# Patient Record
Sex: MALE | Race: WHITE | NOT HISPANIC OR LATINO | Employment: OTHER | URBAN - METROPOLITAN AREA
[De-identification: names, ages, dates, MRNs, and addresses within clinical notes are randomized per-mention and may not be internally consistent; named-entity substitution may affect disease eponyms.]

---

## 2023-08-03 LAB
ALBUMIN (G/DL) IN SER/PLAS: 4.6 G/DL (ref 3.4–5)
ANION GAP IN SER/PLAS: 13 MMOL/L (ref 10–20)
APPEARANCE, URINE: CLEAR
BILIRUBIN, URINE: NEGATIVE
BLOOD, URINE: ABNORMAL
CALCIUM (MG/DL) IN SER/PLAS: 10.1 MG/DL (ref 8.6–10.6)
CARBON DIOXIDE, TOTAL (MMOL/L) IN SER/PLAS: 27 MMOL/L (ref 21–32)
CHLORIDE (MMOL/L) IN SER/PLAS: 104 MMOL/L (ref 98–107)
CHOLESTEROL (MG/DL) IN SER/PLAS: 158 MG/DL (ref 0–199)
CHOLESTEROL IN HDL (MG/DL) IN SER/PLAS: 34.9 MG/DL
CHOLESTEROL/HDL RATIO: 4.5
COLOR, URINE: ABNORMAL
CREATININE (MG/DL) IN SER/PLAS: 1.14 MG/DL (ref 0.5–1.3)
GFR MALE: 73 ML/MIN/1.73M2
GLUCOSE (MG/DL) IN SER/PLAS: 93 MG/DL (ref 74–99)
GLUCOSE, URINE: NEGATIVE MG/DL
KETONES, URINE: NEGATIVE MG/DL
LDL: 92 MG/DL (ref 0–99)
LEUKOCYTE ESTERASE, URINE: NEGATIVE
MUCUS, URINE: NORMAL /LPF
NITRITE, URINE: NEGATIVE
PH, URINE: 5 (ref 5–8)
PHOSPHATE (MG/DL) IN SER/PLAS: 3.2 MG/DL (ref 2.5–4.9)
POTASSIUM (MMOL/L) IN SER/PLAS: 4.6 MMOL/L (ref 3.5–5.3)
PROSTATE SPECIFIC AG (NG/ML) IN SER/PLAS: 0.58 NG/ML (ref 0–4)
PROTEIN, URINE: NEGATIVE MG/DL
RBC, URINE: 1 /HPF (ref 0–5)
SODIUM (MMOL/L) IN SER/PLAS: 139 MMOL/L (ref 136–145)
SPECIFIC GRAVITY, URINE: 1.01 (ref 1–1.03)
TRIGLYCERIDE (MG/DL) IN SER/PLAS: 157 MG/DL (ref 0–149)
UREA NITROGEN (MG/DL) IN SER/PLAS: 22 MG/DL (ref 6–23)
UROBILINOGEN, URINE: <2 MG/DL (ref 0–1.9)
VLDL: 31 MG/DL (ref 0–40)
WBC, URINE: NORMAL /HPF (ref 0–5)

## 2023-08-04 LAB — URINE CULTURE: NO GROWTH

## 2024-01-22 ENCOUNTER — TELEMEDICINE (OUTPATIENT)
Dept: PRIMARY CARE | Facility: CLINIC | Age: 64
End: 2024-01-22
Payer: COMMERCIAL

## 2024-01-22 DIAGNOSIS — U07.1 COVID-19: Primary | ICD-10-CM

## 2024-01-22 PROBLEM — N28.1 RENAL CYSTS, ACQUIRED, BILATERAL: Status: RESOLVED | Noted: 2024-01-22 | Resolved: 2024-01-22

## 2024-01-22 PROBLEM — K57.30 DIVERTICULOSIS OF SIGMOID COLON: Status: RESOLVED | Noted: 2024-01-22 | Resolved: 2024-01-22

## 2024-01-22 PROBLEM — M25.661 DECREASED RANGE OF MOTION OF RIGHT KNEE: Status: RESOLVED | Noted: 2024-01-22 | Resolved: 2024-01-22

## 2024-01-22 PROBLEM — I49.9 ARRHYTHMIA: Status: RESOLVED | Noted: 2024-01-22 | Resolved: 2024-01-22

## 2024-01-22 PROBLEM — M54.50 LOW BACK PAIN: Status: RESOLVED | Noted: 2024-01-22 | Resolved: 2024-01-22

## 2024-01-22 PROCEDURE — 99214 OFFICE O/P EST MOD 30 MIN: CPT | Performed by: STUDENT IN AN ORGANIZED HEALTH CARE EDUCATION/TRAINING PROGRAM

## 2024-01-22 NOTE — PROGRESS NOTES
Subjective   Patient ID: Fabrizio Copeland is a 63 y.o. male who presents for No chief complaint on file..    HPI     Patient presenting today and is COVID positive with symptoms that started less than 5 days ago; this patient is experiencing with mild-mod symptoms, at home, not on oxygen. Given this patient's comorbidities and age this patient meets criteria for Paxlovid. This is dosed based on renal function.    I have e-scripted this in to the patient's preferred pharmacy. Patient is to isolate. All questions and concerns were addressed.     Review of Systems    Objective   There were no vitals taken for this visit.    Physical Exam  Gen: Well appearing, AAO x 3.   HEENT: NC/AT. Symmetric pupils on webcam.   Pulm: no evidence of increased work of breathing on webcam  Skin: no blemishes or rashes on webcam    Lab Results   Component Value Date    WBC 6.6 08/13/2022    HGB 15.7 08/13/2022    HCT 48.4 08/13/2022     08/13/2022    CHOL 158 08/03/2023    TRIG 157 (H) 08/03/2023    HDL 34.9 (A) 08/03/2023    ALT 25 08/13/2022    AST 19 08/13/2022     08/03/2023    K 4.6 08/03/2023     08/03/2023    CREATININE 1.14 08/03/2023    BUN 22 08/03/2023    CO2 27 08/03/2023    PSA 0.58 08/03/2023    HGBA1C 5.0 05/12/2020     par   Assessment/Plan     Patient presenting today and is COVID positive with symptoms that started less than 5 days ago; this patient is experiencing with mild-mod symptoms, at home, not on oxygen. Given this patient's comorbidities and age this patient meets criteria for Paxlovid. This is dosed based on renal function.    I have e-scripted this in to the patient's preferred pharmacy. Patient is to isolate. All questions and concerns were addressed.

## 2024-05-20 ENCOUNTER — PATIENT MESSAGE (OUTPATIENT)
Dept: PRIMARY CARE | Facility: CLINIC | Age: 64
End: 2024-05-20
Payer: COMMERCIAL

## 2024-05-20 DIAGNOSIS — Z00.00 HEALTHCARE MAINTENANCE: Primary | ICD-10-CM

## 2024-06-07 ENCOUNTER — LAB (OUTPATIENT)
Dept: LAB | Facility: LAB | Age: 64
End: 2024-06-07
Payer: COMMERCIAL

## 2024-06-07 DIAGNOSIS — Z00.00 HEALTHCARE MAINTENANCE: ICD-10-CM

## 2024-06-07 DIAGNOSIS — C64.9 MALIGNANT NEOPLASM OF UNSPECIFIED KIDNEY, EXCEPT RENAL PELVIS (MULTI): Primary | ICD-10-CM

## 2024-06-07 LAB
ALBUMIN SERPL BCP-MCNC: 4.6 G/DL (ref 3.4–5)
ALP SERPL-CCNC: 62 U/L (ref 33–136)
ALT SERPL W P-5'-P-CCNC: 21 U/L (ref 10–52)
ANION GAP SERPL CALC-SCNC: 13 MMOL/L (ref 10–20)
APPEARANCE UR: CLEAR
AST SERPL W P-5'-P-CCNC: 18 U/L (ref 9–39)
BASOPHILS # BLD AUTO: 0.05 X10*3/UL (ref 0–0.1)
BASOPHILS NFR BLD AUTO: 0.8 %
BILIRUB SERPL-MCNC: 0.7 MG/DL (ref 0–1.2)
BILIRUB UR STRIP.AUTO-MCNC: NEGATIVE MG/DL
BUN SERPL-MCNC: 20 MG/DL (ref 6–23)
CALCIUM SERPL-MCNC: 9.9 MG/DL (ref 8.6–10.6)
CHLORIDE SERPL-SCNC: 102 MMOL/L (ref 98–107)
CHOLEST SERPL-MCNC: 158 MG/DL (ref 0–199)
CHOLESTEROL/HDL RATIO: 4
CO2 SERPL-SCNC: 28 MMOL/L (ref 21–32)
COLOR UR: NORMAL
CREAT SERPL-MCNC: 1.1 MG/DL (ref 0.5–1.3)
EGFRCR SERPLBLD CKD-EPI 2021: 76 ML/MIN/1.73M*2
EOSINOPHIL # BLD AUTO: 0.11 X10*3/UL (ref 0–0.7)
EOSINOPHIL NFR BLD AUTO: 1.7 %
ERYTHROCYTE [DISTWIDTH] IN BLOOD BY AUTOMATED COUNT: 12.4 % (ref 11.5–14.5)
GLUCOSE SERPL-MCNC: 93 MG/DL (ref 74–99)
GLUCOSE UR STRIP.AUTO-MCNC: NORMAL MG/DL
HCT VFR BLD AUTO: 48.2 % (ref 41–52)
HDLC SERPL-MCNC: 40 MG/DL
HGB BLD-MCNC: 15.8 G/DL (ref 13.5–17.5)
IMM GRANULOCYTES # BLD AUTO: 0.02 X10*3/UL (ref 0–0.7)
IMM GRANULOCYTES NFR BLD AUTO: 0.3 % (ref 0–0.9)
KETONES UR STRIP.AUTO-MCNC: NEGATIVE MG/DL
LDLC SERPL CALC-MCNC: 99 MG/DL
LEUKOCYTE ESTERASE UR QL STRIP.AUTO: NEGATIVE
LYMPHOCYTES # BLD AUTO: 1.44 X10*3/UL (ref 1.2–4.8)
LYMPHOCYTES NFR BLD AUTO: 22 %
MCH RBC QN AUTO: 30.6 PG (ref 26–34)
MCHC RBC AUTO-ENTMCNC: 32.8 G/DL (ref 32–36)
MCV RBC AUTO: 93 FL (ref 80–100)
MONOCYTES # BLD AUTO: 0.67 X10*3/UL (ref 0.1–1)
MONOCYTES NFR BLD AUTO: 10.2 %
NEUTROPHILS # BLD AUTO: 4.26 X10*3/UL (ref 1.2–7.7)
NEUTROPHILS NFR BLD AUTO: 65 %
NITRITE UR QL STRIP.AUTO: NEGATIVE
NON HDL CHOLESTEROL: 118 MG/DL (ref 0–149)
NRBC BLD-RTO: 0 /100 WBCS (ref 0–0)
PH UR STRIP.AUTO: 5.5 [PH]
PHOSPHATE SERPL-MCNC: 2.8 MG/DL (ref 2.5–4.9)
PLATELET # BLD AUTO: 201 X10*3/UL (ref 150–450)
POTASSIUM SERPL-SCNC: 4.6 MMOL/L (ref 3.5–5.3)
PROT SERPL-MCNC: 7.2 G/DL (ref 6.4–8.2)
PROT UR STRIP.AUTO-MCNC: NEGATIVE MG/DL
PSA SERPL-MCNC: 0.61 NG/ML
RBC # BLD AUTO: 5.17 X10*6/UL (ref 4.5–5.9)
RBC # UR STRIP.AUTO: NEGATIVE /UL
SODIUM SERPL-SCNC: 138 MMOL/L (ref 136–145)
SP GR UR STRIP.AUTO: 1.01
TRIGL SERPL-MCNC: 94 MG/DL (ref 0–149)
UROBILINOGEN UR STRIP.AUTO-MCNC: NORMAL MG/DL
VLDL: 19 MG/DL (ref 0–40)
WBC # BLD AUTO: 6.6 X10*3/UL (ref 4.4–11.3)

## 2024-06-07 PROCEDURE — 36415 COLL VENOUS BLD VENIPUNCTURE: CPT

## 2024-06-07 PROCEDURE — 85025 COMPLETE CBC W/AUTO DIFF WBC: CPT

## 2024-06-07 PROCEDURE — 84153 ASSAY OF PSA TOTAL: CPT

## 2024-06-07 PROCEDURE — 80061 LIPID PANEL: CPT

## 2024-06-07 PROCEDURE — 81003 URINALYSIS AUTO W/O SCOPE: CPT

## 2024-06-07 PROCEDURE — 80053 COMPREHEN METABOLIC PANEL: CPT

## 2024-06-07 PROCEDURE — 84100 ASSAY OF PHOSPHORUS: CPT

## 2024-06-12 ENCOUNTER — HOSPITAL ENCOUNTER (OUTPATIENT)
Dept: RADIOLOGY | Facility: CLINIC | Age: 64
Discharge: HOME | End: 2024-06-12
Payer: COMMERCIAL

## 2024-06-12 DIAGNOSIS — C64.9 MALIGNANT NEOPLASM OF UNSPECIFIED KIDNEY, EXCEPT RENAL PELVIS (MULTI): ICD-10-CM

## 2024-06-12 PROCEDURE — 76770 US EXAM ABDO BACK WALL COMP: CPT

## 2024-06-12 PROCEDURE — 76770 US EXAM ABDO BACK WALL COMP: CPT | Performed by: RADIOLOGY

## 2024-07-01 ENCOUNTER — APPOINTMENT (OUTPATIENT)
Dept: PRIMARY CARE | Facility: CLINIC | Age: 64
End: 2024-07-01
Payer: COMMERCIAL

## 2024-07-11 ENCOUNTER — APPOINTMENT (OUTPATIENT)
Dept: UROLOGY | Facility: CLINIC | Age: 64
End: 2024-07-11
Payer: COMMERCIAL

## 2024-07-25 ENCOUNTER — APPOINTMENT (OUTPATIENT)
Dept: UROLOGY | Facility: CLINIC | Age: 64
End: 2024-07-25
Payer: COMMERCIAL

## 2024-07-25 VITALS
HEIGHT: 68 IN | BODY MASS INDEX: 33.8 KG/M2 | HEART RATE: 86 BPM | TEMPERATURE: 96.4 F | SYSTOLIC BLOOD PRESSURE: 125 MMHG | WEIGHT: 223 LBS | DIASTOLIC BLOOD PRESSURE: 74 MMHG

## 2024-07-25 DIAGNOSIS — R82.90 URINE ABNORMALITY: ICD-10-CM

## 2024-07-25 DIAGNOSIS — R31.0 GROSS HEMATURIA: Primary | ICD-10-CM

## 2024-07-25 LAB
POC APPEARANCE, URINE: CLEAR
POC BILIRUBIN, URINE: NEGATIVE
POC BLOOD, URINE: NEGATIVE
POC COLOR, URINE: YELLOW
POC GLUCOSE, URINE: NEGATIVE MG/DL
POC KETONES, URINE: NEGATIVE MG/DL
POC LEUKOCYTES, URINE: NEGATIVE
POC NITRITE,URINE: NEGATIVE
POC PH, URINE: 6 PH
POC PROTEIN, URINE: NEGATIVE MG/DL
POC SPECIFIC GRAVITY, URINE: 1.01
POC UROBILINOGEN, URINE: 0.2 EU/DL

## 2024-07-25 PROCEDURE — 3008F BODY MASS INDEX DOCD: CPT | Performed by: NURSE PRACTITIONER

## 2024-07-25 PROCEDURE — 99214 OFFICE O/P EST MOD 30 MIN: CPT | Performed by: NURSE PRACTITIONER

## 2024-07-25 PROCEDURE — 81003 URINALYSIS AUTO W/O SCOPE: CPT | Performed by: NURSE PRACTITIONER

## 2024-07-25 NOTE — PROGRESS NOTES
UROLOGIC FOLLOW-UP VISIT     PROBLEM LIST:  1. Gross hematuria  CT urography w 3D volume rendered imaging    Creatinine, Serum      2. Urine abnormality  POCT UA Automated manually resulted           HISTORY OF PRESENT ILLNESS:   Fabrizio Copeland is a 64 y.o. with hx of RCC s/p partial nephrectomy 11/28/12  BPH not on medication    INTERVAL HISTORY:  Returns today for FUV  Seen accompanied by spouse  Notes hematuria and small clot after taking Mobic  No bleeding with ibuprofen, even when taking around the clock  Bleeding also occurs with dehydration, resolves with hydration  Needs an MRI, asking about ?clips from nephrectomy    PAST MEDICAL HISTORY:  Past Medical History:   Diagnosis Date    Arrhythmia 01/22/2024    Decreased range of motion of right knee 01/22/2024    Diverticulosis of sigmoid colon 01/22/2024    Low back pain 01/22/2024    Other conditions influencing health status 04/18/2014    No significant past medical history    Personal history of other diseases of urinary system     History of kidney disease    Renal cysts, acquired, bilateral 01/22/2024       PAST SURGICAL HISTORY:  Past Surgical History:   Procedure Laterality Date    HERNIA REPAIR  01/21/2014    Hernia Repair    OTHER SURGICAL HISTORY  01/21/2014    Jaw Fracture Treatment Compound    OTHER SURGICAL HISTORY  01/21/2014    Partial Nephrectomy        ALLERGIES:   No Known Allergies     MEDICATIONS:   No current outpatient medications on file prior to visit.     No current facility-administered medications on file prior to visit.        SOCIAL HISTORY:  Patient  reports that he has never smoked. He has never used smokeless tobacco. He reports that he does not drink alcohol and does not use drugs.   Social History     Socioeconomic History    Marital status:      Spouse name: Not on file    Number of children: Not on file    Years of education: Not on file    Highest education level: Not on file   Occupational History    Not on file    Tobacco Use    Smoking status: Never    Smokeless tobacco: Never   Substance and Sexual Activity    Alcohol use: Never    Drug use: Never    Sexual activity: Not on file   Other Topics Concern    Not on file   Social History Narrative    Not on file     Social Determinants of Health     Financial Resource Strain: Not on file   Food Insecurity: Not on file   Transportation Needs: Not on file   Physical Activity: Not on file   Stress: Not on file   Social Connections: Not on file   Intimate Partner Violence: Not on file   Housing Stability: Not on file       FAMILY HISTORY:  No family history on file.    REVIEW OF SYSTEMS:  All systems reviewed, pertinent negatives as noted in HPI.     PHYSICAL EXAM:  Visit Vitals  /74   Pulse 86   Temp 35.8 °C (96.4 °F) (Temporal)     Constitutional: Well-developed and well-nourished. No distress.    Head: Normocephalic and atraumatic.    Neck: Normal range of motion.     Pulmonary/Chest: Effort normal. No respiratory distress.   Abdominal: Non-distended.  : See below.  Integumentary: No rash or lesions visualized.  Musculoskeletal: Normal range of motion.    Neurological: Alert and oriented.  Psychiatric: Normal mood and affect. Thought content normal.      LABORATORY REVIEW:   Lab Results   Component Value Date    BUN 20 06/07/2024    CREATININE 1.10 06/07/2024    EGFR 76 06/07/2024     06/07/2024    K 4.6 06/07/2024     06/07/2024    CO2 28 06/07/2024    CALCIUM 9.9 06/07/2024      Lab Results   Component Value Date    WBC 6.6 06/07/2024    RBC 5.17 06/07/2024    HGB 15.8 06/07/2024    HCT 48.2 06/07/2024    MCV 93 06/07/2024    MCH 30.6 06/07/2024    MCHC 32.8 06/07/2024    RDW 12.4 06/07/2024     06/07/2024        Lab Results   Component Value Date    PSA 0.61 06/07/2024    PSA 0.58 08/03/2023    PSA 0.72 08/13/2022    PSA 0.54 05/12/2020    PSA 0.65 08/17/2019    PSA 0.68 01/21/2019    PSA 0.63 02/05/2018        Urine dipstick shows negative for all  components.        Assessment:      1. Gross hematuria  CT urography w 3D volume rendered imaging    Creatinine, Serum      2. Urine abnormality  POCT UA Automated manually resulted          Fabrizio Copeland is a 64 y.o. with hx of RCC s/p partial nephrectomy 11/28/12; op note not available in Epic  Surgical clips noted most recently on CT A/P 7/17/18  Needs MRI of knee, looking for details about clips that were used  Recent gross hematuria associated with Mobic   Renal US 6/12/24 with small B cysts  UA today negative     Plan:   CT urogram  Will reach out to Dr. Campos's team for details about surgical clips  RTC with Dr. Campos for cystoscopy  Encouraged to contact us in the interim with any questions, concerns

## 2024-08-05 ENCOUNTER — APPOINTMENT (OUTPATIENT)
Dept: PRIMARY CARE | Facility: CLINIC | Age: 64
End: 2024-08-05
Payer: COMMERCIAL

## 2024-08-05 VITALS
HEART RATE: 71 BPM | HEIGHT: 68 IN | SYSTOLIC BLOOD PRESSURE: 120 MMHG | DIASTOLIC BLOOD PRESSURE: 70 MMHG | TEMPERATURE: 97.8 F | WEIGHT: 210.2 LBS | BODY MASS INDEX: 31.86 KG/M2

## 2024-08-05 DIAGNOSIS — Z00.00 HEALTHCARE MAINTENANCE: Primary | ICD-10-CM

## 2024-08-05 DIAGNOSIS — G89.29 CHRONIC PAIN OF RIGHT KNEE: ICD-10-CM

## 2024-08-05 DIAGNOSIS — E66.09 CLASS 1 OBESITY DUE TO EXCESS CALORIES WITHOUT SERIOUS COMORBIDITY WITH BODY MASS INDEX (BMI) OF 31.0 TO 31.9 IN ADULT: ICD-10-CM

## 2024-08-05 DIAGNOSIS — M25.561 CHRONIC PAIN OF RIGHT KNEE: ICD-10-CM

## 2024-08-05 DIAGNOSIS — N02.9 IDIOPATHIC HEMATURIA, UNSPECIFIED WHETHER GLOMERULAR MORPHOLOGIC CHANGES PRESENT: ICD-10-CM

## 2024-08-05 PROCEDURE — 90750 HZV VACC RECOMBINANT IM: CPT | Performed by: STUDENT IN AN ORGANIZED HEALTH CARE EDUCATION/TRAINING PROGRAM

## 2024-08-05 PROCEDURE — 99396 PREV VISIT EST AGE 40-64: CPT | Performed by: STUDENT IN AN ORGANIZED HEALTH CARE EDUCATION/TRAINING PROGRAM

## 2024-08-05 PROCEDURE — 1036F TOBACCO NON-USER: CPT | Performed by: STUDENT IN AN ORGANIZED HEALTH CARE EDUCATION/TRAINING PROGRAM

## 2024-08-05 PROCEDURE — 90471 IMMUNIZATION ADMIN: CPT | Performed by: STUDENT IN AN ORGANIZED HEALTH CARE EDUCATION/TRAINING PROGRAM

## 2024-08-05 PROCEDURE — 3008F BODY MASS INDEX DOCD: CPT | Performed by: STUDENT IN AN ORGANIZED HEALTH CARE EDUCATION/TRAINING PROGRAM

## 2024-08-05 PROCEDURE — 90472 IMMUNIZATION ADMIN EACH ADD: CPT | Performed by: STUDENT IN AN ORGANIZED HEALTH CARE EDUCATION/TRAINING PROGRAM

## 2024-08-05 PROCEDURE — 90679 RSV VACC PREF RECOMB ADJT IM: CPT | Performed by: STUDENT IN AN ORGANIZED HEALTH CARE EDUCATION/TRAINING PROGRAM

## 2024-08-05 ASSESSMENT — ENCOUNTER SYMPTOMS
LOSS OF SENSATION IN FEET: 0
OCCASIONAL FEELINGS OF UNSTEADINESS: 0
DEPRESSION: 0

## 2024-08-05 ASSESSMENT — PAIN SCALES - GENERAL: PAINLEVEL: 10-WORST PAIN EVER

## 2024-08-05 NOTE — PATIENT INSTRUCTIONS
Your blood work is up to date; no need for additional draw at this appointment    Follow up with your specialists as previously scheduled.     Congrats on the weight loss! Keep at it with diet and exercise.     You received your first shingrix shot today! Follow up in 2-6 months to complete this two-step series.      You got your RSV shot today!     See me in 3 months.

## 2024-08-05 NOTE — PROGRESS NOTES
"Subjective   Patient ID: Devaughn Copeland is a 64 y.o. male who presents for Annual Exam (cpe).    HPI   Has been ~2.5 years or so since last in. Became a grandfather since last in! Work and family are going well.    Re: obesity - Down ~20lb over the last 3 months with diet and exercise.     Re: RCC - see urology notes. Hx remote surgery (2012) for RCC. Has CT and cystoscopy scheduled in the coming weeks for occasional hematuria with clots.    Re: knee- R knee pain, subacute on chronic. In with ortho. Had injection into the knee and also improved greatly with PT. Has upcoming MRI soon once he gets clearance (?sutures/clips from RCC?). Pain is manageable however.    Re: HM - CRS current (2020), repeat 6/2025. PSA UTD. Due for RSV and VZV, amenable to both.    PMHx, FHx, Social Hx, Surg Hx personally reviewed at this appointment. No pertinent findings and/or changes from prior (if applicable).    ROS: Denies wt gain/loss f/c HA LoC CP SOB NVDC. See HPI above, and scanned sheet (if applicable). All other systems are reviewed and are without complaint.       Review of Systems    Objective   /70 (BP Location: Left arm, Patient Position: Sitting, BP Cuff Size: Large adult)   Pulse 71   Temp 36.6 °C (97.8 °F) (Temporal)   Ht 1.727 m (5' 8\")   Wt 95.3 kg (210 lb 3.2 oz)   BMI 31.96 kg/m²     Physical Exam  Gen: well developed in NAD. AAO x3.  HEENT: NC/AT. Anicteric sclera, symmetric pupils. MMM no thrush.  Neck: Soft, supple. No LAD. No goiter.   CV: RRR nl s1s2 no m/r/g  Pulm: CTAB no w/r/r, good air exchange  GI: soft NTND BS+ no hsm  Ext: WWP no edema  Neuro: II-XII grossly intact, nonfocal systemic findings  MSK: 5/5 strength b/l UE and LE  Gait: unremarkable     Lab Results   Component Value Date    WBC 6.6 06/07/2024    HGB 15.8 06/07/2024    HCT 48.2 06/07/2024     06/07/2024    CHOL 158 06/07/2024    TRIG 94 06/07/2024    HDL 40.0 06/07/2024    ALT 21 06/07/2024    AST 18 06/07/2024     " 06/07/2024    K 4.6 06/07/2024     06/07/2024    CREATININE 1.10 06/07/2024    BUN 20 06/07/2024    CO2 28 06/07/2024    PSA 0.61 06/07/2024    HGBA1C 5.0 05/12/2020     par    US renal complete  Narrative: Interpreted By:  Juan Carlos Guerra,   STUDY:  US RENAL COMPLETE;  6/12/2024 8:31 am      INDICATION:  Signs/Symptoms: hx RCC, partial nephrectomy  C64.9: Malignant  neoplasm of kidney.      COMPARISON:  None.      ACCESSION NUMBER(S):  [id number]      ORDERING CLINICIAN:  HANSEL BLANCHARD      TECHNIQUE:  Multiple images of the kidneys were obtained  .      FINDINGS:  RIGHT KIDNEY:  The right kidney measures 10 in length. The renal cortical  echogenicity and thickness are within normal limits. No  hydronephrosis is present; no evidence of nephrolithiasis. Simple  small right renal cyst measuring 13 x 9 x 11 mm.      LEFT KIDNEY:  The left kidney measures 12.6 in length. The renal cortical  echogenicity and thickness are within normal limits. No  hydronephrosis is present; no evidence of nephrolithiasis. Small left  renal cyst measuring approximately 10 mm in average diameter.      BLADDER:  Grossly unremarkable.      Prostate is 35 x 38 x 41 mm.      Impression: Tiny renal cysts. No hydronephrosis.      MACRO:  None      Signed by: Juan Carlos Guerra 6/13/2024 11:47 AM  Dictation workstation:   [id number]        Assessment/Plan   Doing great.     # Knee  - follow up ortho    # hx RCC, occasional clots in urine  - follow up urology; cystoscopy soon  - PSA UTD    # Obesity: BMI > 30  - counselled on wt loss via diet, exercise, and other lifestyle modifications     # Health Maintenance  - routine blood work  - Colon Cancer Screening: UTD, repeat 6/2025  - PSA: UTD  - Immunizations:  RSV and VZV 1 of 2  - AAA screening:  not indicated     Problem List Items Addressed This Visit             ICD-10-CM    Idiopathic hematuria N02.9    Chronic pain of right knee M25.561, G89.29    Healthcare maintenance - Primary Z00.00

## 2024-08-15 ENCOUNTER — APPOINTMENT (OUTPATIENT)
Dept: RADIOLOGY | Facility: CLINIC | Age: 64
End: 2024-08-15
Payer: COMMERCIAL

## 2024-08-26 ENCOUNTER — LAB (OUTPATIENT)
Dept: LAB | Facility: LAB | Age: 64
End: 2024-08-26
Payer: COMMERCIAL

## 2024-08-26 DIAGNOSIS — R31.0 GROSS HEMATURIA: ICD-10-CM

## 2024-08-26 LAB
CREAT SERPL-MCNC: 0.97 MG/DL (ref 0.5–1.3)
EGFRCR SERPLBLD CKD-EPI 2021: 88 ML/MIN/1.73M*2

## 2024-08-26 PROCEDURE — 36415 COLL VENOUS BLD VENIPUNCTURE: CPT

## 2024-08-26 PROCEDURE — 82565 ASSAY OF CREATININE: CPT

## 2024-08-29 ENCOUNTER — HOSPITAL ENCOUNTER (OUTPATIENT)
Dept: RADIOLOGY | Facility: CLINIC | Age: 64
Discharge: HOME | End: 2024-08-29
Payer: COMMERCIAL

## 2024-08-29 DIAGNOSIS — R31.0 GROSS HEMATURIA: ICD-10-CM

## 2024-08-29 PROCEDURE — 76377 3D RENDER W/INTRP POSTPROCES: CPT

## 2024-08-29 PROCEDURE — 2550000001 HC RX 255 CONTRASTS: Performed by: NURSE PRACTITIONER

## 2024-09-19 ENCOUNTER — PROCEDURE VISIT (OUTPATIENT)
Dept: UROLOGY | Facility: HOSPITAL | Age: 64
End: 2024-09-19
Payer: COMMERCIAL

## 2024-09-19 DIAGNOSIS — Z79.2 PROPHYLACTIC ANTIBIOTIC: ICD-10-CM

## 2024-09-19 PROCEDURE — 2500000001 HC RX 250 WO HCPCS SELF ADMINISTERED DRUGS (ALT 637 FOR MEDICARE OP): Performed by: UROLOGY

## 2024-09-19 PROCEDURE — 52000 CYSTOURETHROSCOPY: CPT | Performed by: UROLOGY

## 2024-09-19 RX ORDER — CIPROFLOXACIN 500 MG/1
500 TABLET ORAL ONCE
Status: COMPLETED | OUTPATIENT
Start: 2024-09-19 | End: 2024-09-19

## 2024-09-19 NOTE — PROGRESS NOTES
"FUV    Last visit: 7/25/24     HISTORY OF PRESENT ILLNESS:   Fabrizio Copeland is a 64 y.o. male who is being seen today for cystoscopy to evaluate for hematuria.     PAST MEDICAL HISTORY:  Past Medical History:   Diagnosis Date    Acute pain of right knee 09/20/2023    Arrhythmia 01/22/2024    Decreased range of motion of right knee 01/22/2024    Diverticulosis of sigmoid colon 01/22/2024    Low back pain 01/22/2024    Other conditions influencing health status 04/18/2014    No significant past medical history    Personal history of other diseases of urinary system     History of kidney disease    Renal cell carcinoma (Multi)     Renal cysts, acquired, bilateral 01/22/2024     PAST SURGICAL HISTORY:  Past Surgical History:   Procedure Laterality Date    HERNIA REPAIR  01/21/2014    Hernia Repair    OTHER SURGICAL HISTORY  01/21/2014    Jaw Fracture Treatment Compound    OTHER SURGICAL HISTORY  01/21/2014    Partial Nephrectomy    PARTIAL NEPHRECTOMY          ALLERGIES:   No Known Allergies     MEDICATIONS:   No current outpatient medications     PHYSICAL EXAM:  There were no vitals taken for this visit.  Constitutional: Patient appears well-developed and well-nourished. No distress.    Pulmonary/Chest: Effort normal. No respiratory distress.   Abdominal: Soft, ND NT  : WNL  Musculoskeletal: Normal range of motion.    Neurological: Alert and oriented to person, place, and time.  Psychiatric: Normal mood and affect. Behavior is normal. Thought content normal.      Labs:  No results found for: \"TESTOSTERONE\"  Lab Results   Component Value Date    PSA 0.61 06/07/2024     No components found for: \"CBC\"  Lab Results   Component Value Date    CREATININE 0.97 08/26/2024     No components found for: \"TESTOTMS\"  No results found for: \"TESTF\"    Imaging:  - CT Urogram on 8/29/24 resulted negative.  1.No CT urogram findings to explain patient's gross hematuria.  2. Right lower pole renal cortical scarring status post " partial  nephrectomy.  3. Mild prostatomegaly with median lobe hypertrophy protruding into  the posterior urinary bladder wall.    Discussion: Results reviewed with patient. Patient reassured. The cystoscopy was completed today due to hematuria and demonstrated no tumors, stones or lesions. Cytology sent. 1 abx given to patient in clinic today. Follow up in 6 months to recheck pvr.         Assessment:      1. Prophylactic antibiotic  ciprofloxacin (Cipro) tablet 500 mg          Plan:   Follow up in 6 months to recheck PVR and re-assess.   All questions and concerns were addressed. Patient verbalizes understanding and has no other questions at this time.      Scribe Attestation  By signing my name below, IJanet Scribe   attest that this documentation has been prepared under the direction and in the presence of Kody Campos MD.

## 2024-11-21 ENCOUNTER — APPOINTMENT (OUTPATIENT)
Dept: PRIMARY CARE | Facility: CLINIC | Age: 64
End: 2024-11-21
Payer: COMMERCIAL

## 2024-12-05 ENCOUNTER — APPOINTMENT (OUTPATIENT)
Dept: PRIMARY CARE | Facility: CLINIC | Age: 64
End: 2024-12-05
Payer: COMMERCIAL

## 2024-12-10 ENCOUNTER — APPOINTMENT (OUTPATIENT)
Dept: PRIMARY CARE | Facility: CLINIC | Age: 64
End: 2024-12-10
Payer: COMMERCIAL

## 2024-12-10 DIAGNOSIS — Z23 ENCOUNTER FOR IMMUNIZATION: ICD-10-CM

## 2024-12-10 PROCEDURE — 90471 IMMUNIZATION ADMIN: CPT | Performed by: STUDENT IN AN ORGANIZED HEALTH CARE EDUCATION/TRAINING PROGRAM

## 2024-12-10 PROCEDURE — 90750 HZV VACC RECOMBINANT IM: CPT | Performed by: STUDENT IN AN ORGANIZED HEALTH CARE EDUCATION/TRAINING PROGRAM

## 2024-12-10 NOTE — PROGRESS NOTES
"Patient ID: Fabrizio Copeland \"Devaughn\" is a 64 y.o. male.    ProceduresPatient present today to get a shingle vaccine.  Patient states that he feels fine.   Injection given in right deltoid.  Patient did well with the injection.  "

## 2025-02-27 ENCOUNTER — APPOINTMENT (OUTPATIENT)
Dept: UROLOGY | Facility: HOSPITAL | Age: 65
End: 2025-02-27
Payer: COMMERCIAL

## 2025-05-01 DIAGNOSIS — Z12.11 COLON CANCER SCREENING: ICD-10-CM

## 2025-05-01 RX ORDER — SODIUM, POTASSIUM,MAG SULFATES 17.5-3.13G
SOLUTION, RECONSTITUTED, ORAL ORAL
Qty: 1 EACH | Refills: 0 | Status: SHIPPED | OUTPATIENT
Start: 2025-05-01

## 2025-06-12 NOTE — PROGRESS NOTES
"FUV    Last visit: 9/19/24     HISTORY OF PRESENT ILLNESS:   Fabrizio Copeland is a 65 y.o. male who is being seen today for 6 MONTH FUV  -  hematuria with negative work up   -had a right partial nephrectomy in 2012    PAST MEDICAL HISTORY:  Past Medical History:   Diagnosis Date    Acute pain of right knee 09/20/2023    Arrhythmia 01/22/2024    Decreased range of motion of right knee 01/22/2024    Diverticulosis of sigmoid colon 01/22/2024    Low back pain 01/22/2024    Other conditions influencing health status 04/18/2014    No significant past medical history    Personal history of other diseases of urinary system     History of kidney disease    Renal cell carcinoma (Multi)     Renal cysts, acquired, bilateral 01/22/2024     PAST SURGICAL HISTORY:  Past Surgical History:   Procedure Laterality Date    HERNIA REPAIR  01/21/2014    Hernia Repair    OTHER SURGICAL HISTORY  01/21/2014    Jaw Fracture Treatment Compound    OTHER SURGICAL HISTORY  01/21/2014    Partial Nephrectomy    PARTIAL NEPHRECTOMY          ALLERGIES:   No Known Allergies     MEDICATIONS:   Current Outpatient Medications   Medication Instructions    sodium,potassium,mag sulfates (Suprep) 17.5-3.13-1.6 gram solution Take one bottle beginning at 6pm night before procedure and then take the other bottle 5 hours before procedure time as directed per instruction sheet        PHYSICAL EXAM:  There were no vitals taken for this visit.  Constitutional: Patient appears well-developed and well-nourished. No distress.    Pulmonary/Chest: Effort normal. No respiratory distress.   Abdominal: Soft, ND NT  : WNL  Musculoskeletal: Normal range of motion.    Neurological: Alert and oriented to person, place, and time.  Psychiatric: Normal mood and affect. Behavior is normal. Thought content normal.      Labs:  No results found for: \"TESTOSTERONE\"  Lab Results   Component Value Date    PSA 0.61 06/07/2024     No components found for: \"CBC\"  Lab Results " "  Component Value Date    CREATININE 0.97 08/26/2024     No components found for: \"TESTOTMS\"  No results found for: \"TESTF\"    Imaging:  - CT Urogram on 8/29/24 resulted negative.  1.No CT urogram findings to explain patient's gross hematuria.  2. Right lower pole renal cortical scarring status post partial  nephrectomy.  3. Mild prostatomegaly with median lobe hypertrophy protruding into  the posterior urinary bladder wall.    Discussion: Results reviewed with patient. Patient reassured. He had a negative hematuria work up with us. Will refer him to nephrology for further evaluation of persistent gross hematuria. Will fu in 6 months with a PSA and check urine for hematuria and re-assess sx. If continues to have hematuria, will consider a renal US and a repeat cystoscopy at that time.     AUA:10  OLLIE:23      Assessment:      1. Screening for prostate cancer  PSA      2. Lower urinary tract symptoms (LUTS)  POCT UA Automated manually resulted      3. Gross hematuria  Referral to Nephrology            Plan:   Will refer him to nephrology for further evaluation of persistent gross hematuria  Will fu in 6 months with a PSA and check urine for hematuria and re-assess sx  All questions and concerns were addressed. Patient verbalizes understanding and has no other questions at this time.      Scribe Attestation  By signing my name below, I, José Miguel Lanza   attest that this documentation has been prepared under the direction and in the presence of Kody Campos MD.    "

## 2025-06-13 ENCOUNTER — OFFICE VISIT (OUTPATIENT)
Dept: UROLOGY | Facility: HOSPITAL | Age: 65
End: 2025-06-13
Payer: COMMERCIAL

## 2025-06-13 DIAGNOSIS — R39.9 LOWER URINARY TRACT SYMPTOMS (LUTS): ICD-10-CM

## 2025-06-13 DIAGNOSIS — R31.0 GROSS HEMATURIA: ICD-10-CM

## 2025-06-13 DIAGNOSIS — Z12.5 SCREENING FOR PROSTATE CANCER: Primary | ICD-10-CM

## 2025-06-13 PROCEDURE — 99214 OFFICE O/P EST MOD 30 MIN: CPT | Performed by: UROLOGY

## 2025-06-13 PROCEDURE — 81003 URINALYSIS AUTO W/O SCOPE: CPT | Performed by: UROLOGY

## 2025-06-20 ENCOUNTER — APPOINTMENT (OUTPATIENT)
Dept: GASTROENTEROLOGY | Facility: EXTERNAL LOCATION | Age: 65
End: 2025-06-20
Payer: COMMERCIAL

## 2025-06-20 DIAGNOSIS — Z86.0101 HISTORY OF ADENOMATOUS POLYP OF COLON: ICD-10-CM

## 2025-06-20 DIAGNOSIS — Z86.0100 HX OF COLONIC POLYP: ICD-10-CM

## 2025-06-20 DIAGNOSIS — Z80.0 FAMILY HISTORY OF COLON CANCER: ICD-10-CM

## 2025-06-20 DIAGNOSIS — Z12.11 COLON CANCER SCREENING: Primary | ICD-10-CM

## 2025-06-20 PROCEDURE — G0105 COLORECTAL SCRN; HI RISK IND: HCPCS | Performed by: INTERNAL MEDICINE

## 2025-12-04 ENCOUNTER — APPOINTMENT (OUTPATIENT)
Dept: PRIMARY CARE | Facility: CLINIC | Age: 65
End: 2025-12-04
Payer: COMMERCIAL

## 2025-12-04 ENCOUNTER — APPOINTMENT (OUTPATIENT)
Dept: NEPHROLOGY | Facility: CLINIC | Age: 65
End: 2025-12-04
Payer: COMMERCIAL